# Patient Record
Sex: FEMALE | Race: WHITE | NOT HISPANIC OR LATINO | Employment: FULL TIME | URBAN - METROPOLITAN AREA
[De-identification: names, ages, dates, MRNs, and addresses within clinical notes are randomized per-mention and may not be internally consistent; named-entity substitution may affect disease eponyms.]

---

## 2017-01-17 ENCOUNTER — HOSPITAL ENCOUNTER (OUTPATIENT)
Dept: LAB | Facility: MEDICAL CENTER | Age: 32
End: 2017-01-17
Attending: OBSTETRICS & GYNECOLOGY
Payer: COMMERCIAL

## 2017-01-17 LAB
GLUCOSE 1H P 50 G GLC PO SERPL-MCNC: 120 MG/DL (ref 70–139)
HCT VFR BLD AUTO: 38.1 % (ref 37–47)
HGB BLD-MCNC: 13.1 G/DL (ref 12–16)
PLATELET # BLD AUTO: 209 K/UL (ref 164–446)

## 2017-01-17 PROCEDURE — 85014 HEMATOCRIT: CPT

## 2017-01-17 PROCEDURE — 36415 COLL VENOUS BLD VENIPUNCTURE: CPT

## 2017-01-17 PROCEDURE — 82950 GLUCOSE TEST: CPT

## 2017-01-17 PROCEDURE — 85049 AUTOMATED PLATELET COUNT: CPT

## 2017-01-17 PROCEDURE — 85018 HEMOGLOBIN: CPT

## 2017-01-19 ENCOUNTER — HOSPITAL ENCOUNTER (OUTPATIENT)
Dept: LAB | Facility: MEDICAL CENTER | Age: 32
End: 2017-01-19
Attending: OBSTETRICS & GYNECOLOGY
Payer: COMMERCIAL

## 2017-01-19 PROCEDURE — 87591 N.GONORRHOEAE DNA AMP PROB: CPT

## 2017-01-19 PROCEDURE — 87491 CHLMYD TRACH DNA AMP PROBE: CPT

## 2017-01-22 LAB
C TRACH DNA SPEC QL NAA+PROBE: NEGATIVE
N GONORRHOEA DNA SPEC QL NAA+PROBE: NEGATIVE
SOURCE 9121: NORMAL
SPECIMEN SOURCE: NORMAL

## 2017-01-23 ENCOUNTER — APPOINTMENT (OUTPATIENT)
Dept: OTHER | Facility: IMAGING CENTER | Age: 32
End: 2017-01-23

## 2017-03-14 ENCOUNTER — HOSPITAL ENCOUNTER (OUTPATIENT)
Dept: LAB | Facility: MEDICAL CENTER | Age: 32
End: 2017-03-14
Attending: PHYSICIAN ASSISTANT
Payer: COMMERCIAL

## 2017-03-14 PROCEDURE — 87653 STREP B DNA AMP PROBE: CPT

## 2017-03-16 LAB — GP B STREP DNA SPEC QL NAA+PROBE: POSITIVE

## 2017-05-03 ENCOUNTER — HOSPITAL ENCOUNTER (INPATIENT)
Facility: MEDICAL CENTER | Age: 32
LOS: 1 days | End: 2017-05-04
Attending: OBSTETRICS & GYNECOLOGY | Admitting: OBSTETRICS & GYNECOLOGY
Payer: COMMERCIAL

## 2017-05-03 LAB
BASOPHILS # BLD AUTO: 0.6 % (ref 0–1.8)
BASOPHILS # BLD: 0.08 K/UL (ref 0–0.12)
EOSINOPHIL # BLD AUTO: 0.01 K/UL (ref 0–0.51)
EOSINOPHIL NFR BLD: 0.1 % (ref 0–6.9)
ERYTHROCYTE [DISTWIDTH] IN BLOOD BY AUTOMATED COUNT: 41.3 FL (ref 35.9–50)
ERYTHROCYTE [DISTWIDTH] IN BLOOD BY AUTOMATED COUNT: 43 FL (ref 35.9–50)
HCT VFR BLD AUTO: 31.1 % (ref 37–47)
HCT VFR BLD AUTO: 39.7 % (ref 37–47)
HGB BLD-MCNC: 10.4 G/DL (ref 12–16)
HGB BLD-MCNC: 13.3 G/DL (ref 12–16)
HOLDING TUBE BB 8507: NORMAL
IMM GRANULOCYTES # BLD AUTO: 0.09 K/UL (ref 0–0.11)
IMM GRANULOCYTES NFR BLD AUTO: 0.6 % (ref 0–0.9)
LYMPHOCYTES # BLD AUTO: 1.62 K/UL (ref 1–4.8)
LYMPHOCYTES NFR BLD: 11.3 % (ref 22–41)
MCH RBC QN AUTO: 28.6 PG (ref 27–33)
MCH RBC QN AUTO: 28.7 PG (ref 27–33)
MCHC RBC AUTO-ENTMCNC: 32.7 G/DL (ref 33.6–35)
MCHC RBC AUTO-ENTMCNC: 33.5 G/DL (ref 33.6–35)
MCV RBC AUTO: 85.7 FL (ref 81.4–97.8)
MCV RBC AUTO: 87.5 FL (ref 81.4–97.8)
MONOCYTES # BLD AUTO: 0.44 K/UL (ref 0–0.85)
MONOCYTES NFR BLD AUTO: 3.1 % (ref 0–13.4)
NEUTROPHILS # BLD AUTO: 12.11 K/UL (ref 2–7.15)
NEUTROPHILS NFR BLD: 84.3 % (ref 44–72)
NRBC # BLD AUTO: 0 K/UL
NRBC BLD AUTO-RTO: 0 /100 WBC
PLATELET # BLD AUTO: 146 K/UL (ref 164–446)
PLATELET # BLD AUTO: 165 K/UL (ref 164–446)
PMV BLD AUTO: 11.7 FL (ref 9–12.9)
PMV BLD AUTO: 12.2 FL (ref 9–12.9)
RBC # BLD AUTO: 3.6 M/UL (ref 4.2–5.4)
RBC # BLD AUTO: 4.63 M/UL (ref 4.2–5.4)
WBC # BLD AUTO: 14.4 K/UL (ref 4.8–10.8)
WBC # BLD AUTO: 17.3 K/UL (ref 4.8–10.8)

## 2017-05-03 PROCEDURE — 304965 HCHG RECOVERY SERVICES

## 2017-05-03 PROCEDURE — A9270 NON-COVERED ITEM OR SERVICE: HCPCS | Performed by: OBSTETRICS & GYNECOLOGY

## 2017-05-03 PROCEDURE — 700102 HCHG RX REV CODE 250 W/ 637 OVERRIDE(OP): Performed by: OBSTETRICS & GYNECOLOGY

## 2017-05-03 PROCEDURE — 4A1HXCZ MONITORING OF PRODUCTS OF CONCEPTION, CARDIAC RATE, EXTERNAL APPROACH: ICD-10-PCS | Performed by: OBSTETRICS & GYNECOLOGY

## 2017-05-03 PROCEDURE — 770002 HCHG ROOM/CARE - OB PRIVATE (112)

## 2017-05-03 PROCEDURE — 36415 COLL VENOUS BLD VENIPUNCTURE: CPT

## 2017-05-03 PROCEDURE — 700111 HCHG RX REV CODE 636 W/ 250 OVERRIDE (IP)

## 2017-05-03 PROCEDURE — 700105 HCHG RX REV CODE 258: Performed by: OBSTETRICS & GYNECOLOGY

## 2017-05-03 PROCEDURE — 85027 COMPLETE CBC AUTOMATED: CPT

## 2017-05-03 PROCEDURE — 700111 HCHG RX REV CODE 636 W/ 250 OVERRIDE (IP): Performed by: OBSTETRICS & GYNECOLOGY

## 2017-05-03 PROCEDURE — 85025 COMPLETE CBC W/AUTO DIFF WBC: CPT

## 2017-05-03 PROCEDURE — 10907ZC DRAINAGE OF AMNIOTIC FLUID, THERAPEUTIC FROM PRODUCTS OF CONCEPTION, VIA NATURAL OR ARTIFICIAL OPENING: ICD-10-PCS | Performed by: OBSTETRICS & GYNECOLOGY

## 2017-05-03 PROCEDURE — 0DQR0ZZ REPAIR ANAL SPHINCTER, OPEN APPROACH: ICD-10-PCS | Performed by: OBSTETRICS & GYNECOLOGY

## 2017-05-03 PROCEDURE — 700112 HCHG RX REV CODE 229: Performed by: OBSTETRICS & GYNECOLOGY

## 2017-05-03 PROCEDURE — 59409 OBSTETRICAL CARE: CPT

## 2017-05-03 RX ORDER — VITAMIN A ACETATE, BETA CAROTENE, ASCORBIC ACID, CHOLECALCIFEROL, .ALPHA.-TOCOPHEROL ACETATE, DL-, THIAMINE MONONITRATE, RIBOFLAVIN, NIACINAMIDE, PYRIDOXINE HYDROCHLORIDE, FOLIC ACID, CYANOCOBALAMIN, CALCIUM CARBONATE, FERROUS FUMARATE, ZINC OXIDE, CUPRIC OXIDE 3080; 12; 120; 400; 1; 1.84; 3; 20; 22; 920; 25; 200; 27; 10; 2 [IU]/1; UG/1; MG/1; [IU]/1; MG/1; MG/1; MG/1; MG/1; MG/1; [IU]/1; MG/1; MG/1; MG/1; MG/1; MG/1
1 TABLET, FILM COATED ORAL EVERY MORNING
Status: DISCONTINUED | OUTPATIENT
Start: 2017-05-04 | End: 2017-05-04 | Stop reason: HOSPADM

## 2017-05-03 RX ORDER — ONDANSETRON 2 MG/ML
4 INJECTION INTRAMUSCULAR; INTRAVENOUS EVERY 6 HOURS PRN
Status: DISCONTINUED | OUTPATIENT
Start: 2017-05-03 | End: 2017-05-04 | Stop reason: HOSPADM

## 2017-05-03 RX ORDER — MISOPROSTOL 200 UG/1
800 TABLET ORAL
Status: DISCONTINUED | OUTPATIENT
Start: 2017-05-03 | End: 2017-05-04 | Stop reason: HOSPADM

## 2017-05-03 RX ORDER — PENICILLIN G POTASSIUM 5000000 [IU]/1
5 INJECTION, POWDER, FOR SOLUTION INTRAMUSCULAR; INTRAVENOUS ONCE
Status: COMPLETED | OUTPATIENT
Start: 2017-05-03 | End: 2017-05-03

## 2017-05-03 RX ORDER — SODIUM CHLORIDE, SODIUM LACTATE, POTASSIUM CHLORIDE, CALCIUM CHLORIDE 600; 310; 30; 20 MG/100ML; MG/100ML; MG/100ML; MG/100ML
INJECTION, SOLUTION INTRAVENOUS PRN
Status: DISCONTINUED | OUTPATIENT
Start: 2017-05-03 | End: 2017-05-04 | Stop reason: HOSPADM

## 2017-05-03 RX ORDER — MISOPROSTOL 200 UG/1
800 TABLET ORAL
Status: COMPLETED | OUTPATIENT
Start: 2017-05-03 | End: 2017-05-03

## 2017-05-03 RX ORDER — ONDANSETRON 4 MG/1
4 TABLET, ORALLY DISINTEGRATING ORAL EVERY 6 HOURS PRN
Status: DISCONTINUED | OUTPATIENT
Start: 2017-05-03 | End: 2017-05-04 | Stop reason: HOSPADM

## 2017-05-03 RX ORDER — DOCUSATE SODIUM 100 MG/1
100 CAPSULE, LIQUID FILLED ORAL 2 TIMES DAILY
Status: DISCONTINUED | OUTPATIENT
Start: 2017-05-03 | End: 2017-05-04 | Stop reason: HOSPADM

## 2017-05-03 RX ORDER — OXYCODONE HYDROCHLORIDE AND ACETAMINOPHEN 5; 325 MG/1; MG/1
2 TABLET ORAL EVERY 4 HOURS PRN
Status: DISCONTINUED | OUTPATIENT
Start: 2017-05-03 | End: 2017-05-04 | Stop reason: HOSPADM

## 2017-05-03 RX ORDER — PENICILLIN G POTASSIUM 5000000 [IU]/1
INJECTION, POWDER, FOR SOLUTION INTRAMUSCULAR; INTRAVENOUS
Status: COMPLETED
Start: 2017-05-03 | End: 2017-05-03

## 2017-05-03 RX ORDER — METHYLERGONOVINE MALEATE 0.2 MG/ML
0.2 INJECTION INTRAVENOUS
Status: DISCONTINUED | OUTPATIENT
Start: 2017-05-03 | End: 2017-05-04 | Stop reason: HOSPADM

## 2017-05-03 RX ORDER — IBUPROFEN 600 MG/1
600 TABLET ORAL EVERY 6 HOURS PRN
Status: DISCONTINUED | OUTPATIENT
Start: 2017-05-03 | End: 2017-05-04 | Stop reason: HOSPADM

## 2017-05-03 RX ORDER — CEFAZOLIN SODIUM 1 G/3ML
INJECTION, POWDER, FOR SOLUTION INTRAMUSCULAR; INTRAVENOUS
Status: ACTIVE
Start: 2017-05-03 | End: 2017-05-03

## 2017-05-03 RX ORDER — OXYCODONE HYDROCHLORIDE AND ACETAMINOPHEN 5; 325 MG/1; MG/1
1 TABLET ORAL EVERY 4 HOURS PRN
Status: DISCONTINUED | OUTPATIENT
Start: 2017-05-03 | End: 2017-05-04 | Stop reason: HOSPADM

## 2017-05-03 RX ORDER — CEFAZOLIN SODIUM 2 G/100ML
2 INJECTION, SOLUTION INTRAVENOUS ONCE
Status: COMPLETED | OUTPATIENT
Start: 2017-05-03 | End: 2017-05-03

## 2017-05-03 RX ORDER — SODIUM CHLORIDE, SODIUM LACTATE, POTASSIUM CHLORIDE, CALCIUM CHLORIDE 600; 310; 30; 20 MG/100ML; MG/100ML; MG/100ML; MG/100ML
INJECTION, SOLUTION INTRAVENOUS
Status: ACTIVE
Start: 2017-05-03 | End: 2017-05-03

## 2017-05-03 RX ADMIN — DOCUSATE SODIUM 100 MG: 100 CAPSULE ORAL at 16:23

## 2017-05-03 RX ADMIN — IBUPROFEN 600 MG: 600 TABLET, FILM COATED ORAL at 11:19

## 2017-05-03 RX ADMIN — DOCUSATE SODIUM 100 MG: 100 CAPSULE ORAL at 21:10

## 2017-05-03 RX ADMIN — Medication 20 UNITS: at 09:58

## 2017-05-03 RX ADMIN — PENICILLIN G POTASSIUM 5 MILLION UNITS: 5000000 POWDER, FOR SOLUTION INTRAMUSCULAR; INTRAPLEURAL; INTRATHECAL; INTRAVENOUS at 05:12

## 2017-05-03 RX ADMIN — MISOPROSTOL 800 MCG: 200 TABLET ORAL at 11:50

## 2017-05-03 RX ADMIN — CEFAZOLIN SODIUM 2 G: 2 INJECTION, SOLUTION INTRAVENOUS at 10:23

## 2017-05-03 RX ADMIN — Medication 20 UNITS: at 11:20

## 2017-05-03 RX ADMIN — OXYCODONE HYDROCHLORIDE AND ACETAMINOPHEN 1 TABLET: 5; 325 TABLET ORAL at 21:13

## 2017-05-03 RX ADMIN — SODIUM CHLORIDE 2.5 MILLION UNITS: 9 INJECTION, SOLUTION INTRAVENOUS at 09:07

## 2017-05-03 RX ADMIN — IBUPROFEN 600 MG: 600 TABLET, FILM COATED ORAL at 18:10

## 2017-05-03 ASSESSMENT — COPD QUESTIONNAIRES
HAVE YOU SMOKED AT LEAST 100 CIGARETTES IN YOUR ENTIRE LIFE: NO/DON'T KNOW
DURING THE PAST 4 WEEKS HOW MUCH DID YOU FEEL SHORT OF BREATH: NONE/LITTLE OF THE TIME
DO YOU EVER COUGH UP ANY MUCUS OR PHLEGM?: NO/ONLY WITH OCCASIONAL COLDS OR INFECTIONS

## 2017-05-03 ASSESSMENT — PAIN SCALES - GENERAL
PAINLEVEL_OUTOF10: 3
PAINLEVEL_OUTOF10: 3

## 2017-05-03 ASSESSMENT — LIFESTYLE VARIABLES
EVER_SMOKED: NEVER
DO YOU DRINK ALCOHOL: NO
ALCOHOL_USE: NO

## 2017-05-03 NOTE — PROGRESS NOTES
0440 32y/o  edc 17, EGA 40 , Here to l&d room s223 with Family. C/O UC's. EFM/TOCO applied, patients states positive fetal movement. Denies vaginal bleeding or leaking of fluid. SVE /1, Dr Reeves updated and orders received  0645 SVE ant lip  0700 report to GANESH Wells

## 2017-05-03 NOTE — IP AVS SNAPSHOT
MWHS Access Code: [email or web address]  Expires: 6/3/2017 10:55 AM    MWHS  A secure, online tool to manage your health information     [employer]’s MWHS® is a secure, online tool that connects you to your personalized health information from the privacy of your home -- day or night - making it very easy for you to manage your healthcare. Once the activation process is completed, you can even access your medical information using the MWHS jaymie, which is available for free in the Apple Jaymie store or Google Play store.     MWHS provides the following levels of access (as shown below):   My Chart Features   [facility] Primary Care Doctor [facility]  Specialists [facility]  Urgent  Care Non-[facility]  Primary Care  Doctor   Email your healthcare team securely and privately 24/7 X X X X   Manage appointments: schedule your next appointment; view details of past/upcoming appointments X      Request prescription refills. X      View recent personal medical records, including lab and immunizations X X X X   View health record, including health history, allergies, medications X X X X   Read reports about your outpatient visits, procedures, consult and ER notes X X X X   See your discharge summary, which is a recap of your hospital and/or ER visit that includes your diagnosis, lab results, and care plan. X X       How to register for MWHS:  1. Go to  [email or web address].  2. Click on the Sign Up Now box, which takes you to the New Member Sign Up page. You will need to provide the following information:  a. Enter your MWHS Access Code exactly as it appears at the top of this page. (You will not need to use this code after you’ve completed the sign-up process. If you do not sign up before the expiration date, you must request a new code.)   b. Enter your date of birth.   c. Enter your home email address.   d. Click Submit, and follow the next screen’s instructions.  3. Create a MWHS ID. This will be your MWHS  login ID and cannot be changed, so think of one that is secure and easy to remember.  4. Create a Veracode password. You can change your password at any time.  5. Enter your Password Reset Question and Answer. This can be used at a later time if you forget your password.   6. Enter your e-mail address. This allows you to receive e-mail notifications when new information is available in Veracode.  7. Click Sign Up. You can now view your health information.    For assistance activating your Verac[employer] account, call [phone]

## 2017-05-03 NOTE — PROGRESS NOTES
"Labor Progress Note    Nilam Freed   IUP at 40.6/active labor      Subjective:  Pt with painful contractions.   Uterine contractions:yes  Pain: Yes. Severity: moderate    Objective:   Filed Vitals:    17 0447 17 0524   BP:  118/87   Pulse:  98   Height: 1.588 m (5' 2.5\")    Weight: 79.833 kg (176 lb)      Fetal heart variability: 140's category 1 no decels  [place]: q 3-5  mins   Cervical: 75% ;  Dilatation .7 ; Station negative1    Membranes ruptured: .no    Meds:   Epidural : .no  Pitocin: .no    Labs:  Recent Results (from the past 24 hour(s))   CBC WITH DIFFERENTIAL    Collection Time: 17  5:00 AM   Result Value Ref Range    WBC 14.4 (H) 4.8 - 10.8 K/uL    RBC 4.63 4.20 - 5.40 M/uL    Hemoglobin 13.3 12.0 - 16.0 g/dL    Hematocrit 39.7 37.0 - 47.0 %    MCV 85.7 81.4 - 97.8 fL    MCH 28.7 27.0 - 33.0 pg    MCHC 33.5 (L) 33.6 - 35.0 g/dL    RDW 41.3 35.9 - 50.0 fL    Platelet Count 165 164 - 446 K/uL    MPV 12.2 9.0 - 12.9 fL    Neutrophils-Polys 84.30 (H) 44.00 - 72.00 %    Lymphocytes 11.30 (L) 22.00 - 41.00 %    Monocytes 3.10 0.00 - 13.40 %    Eosinophils 0.10 0.00 - 6.90 %    Basophils 0.60 0.00 - 1.80 %    Immature Granulocytes 0.60 0.00 - 0.90 %    Nucleated RBC 0.00 /100 WBC    Neutrophils (Absolute) 12.11 (H) 2.00 - 7.15 K/uL    Lymphs (Absolute) 1.62 1.00 - 4.80 K/uL    Monos (Absolute) 0.44 0.00 - 0.85 K/uL    Eos (Absolute) 0.01 0.00 - 0.51 K/uL    Baso (Absolute) 0.08 0.00 - 0.12 K/uL    Immature Granulocytes (abs) 0.09 0.00 - 0.11 K/uL    NRBC (Absolute) 0.00 K/uL   HOLD BLOOD BANK SPECIMEN (NOT TESTED)    Collection Time: 17  5:00 AM   Result Value Ref Range    Holding Tube - Bb DONE        Assessment:   IUP at 40.6/active labor- pt admitted, expt   GBBS positive- pt on Penicillin G per protocol, s/p first dose, 2nd dose in 4 hrs.   Fetal status- reassuring    Maricel Reeves            "

## 2017-05-03 NOTE — H&P
CHIEF COMPLAINT:  Contractions.    HISTORY OF PRESENT ILLNESS:  This patient is a 31-year-old  1, para 0   at 40 weeks and 6 days by dates and ultrasound who has had prenatal care with   Dr. Woods.  It has been uncomplicated.  Patient has been laboring at home   with her  and comes in complaining of regular painful contractions.  When   she arrived, she was found to be 7 cm dilated, 75% effaced, and -1 station.    Patient at this time is laboring and declines anything for pain.  She is GBS   positive and has had one dose of penicillin G.  She did transfer care to Dr. Woods at 26 weeks.  This pregnancy was via IVF secondary to unexplained   infertility.    PAST MEDICAL HISTORY:  Unexplained infertility.    PAST SURGICAL HISTORY:  Tonsillectomy.    MEDICINES:  Prenatal vitamins.    ALLERGIES:  DOXYCYCLINE CAUSES ABDOMINAL PAIN.    OBSTETRICAL HISTORY:  She is a  1.    GYNECOLOGIC HISTORY:  Patient started menstruating at age 11, has a menstrual   cycle every 28 days, lasted about 5 days.  Last menstrual period is on   2016.  No history of STDs.    SOCIAL HISTORY:  Patient is .  She denies tobacco, alcohol or drug use.    PHYSICAL EXAMINATION:  VITAL SIGNS:  Stable.  She is afebrile.  GENERAL:  Pleasant female in some distress secondary to contractions.  LUNGS:  Clear to auscultation bilaterally.  CARDIOVASCULAR:  Regular rhythm.  No murmur.  ABDOMEN:  Soft, gravid, Leopold's to about 7-1/2 pounds.  PELVIC:  Fetal heart tones 140s, category 1 strip.  No decelerations.  [place]   gracie every 3-5 minutes.  Sterile vaginal exam, 6 cm dilated, 75%   effaced, -1 station.  EXTREMITIES:  No calf tenderness.    PRENATAL LABS:  She is GBS positive.  Chlamydia and gonorrhea are negative.    1-hour glucose 120.   H and H 14.2 and 41.5, platelets 237.  Hepatitis B   surface antigen negative.  HIV negative.  Her blood type is O positive, RPR   nonreactive.    ASSESSMENT AND PLAN:  A  31-year-old  1, para 0 at 40 weeks and 6 days   by dates and ultrasound.  1.  Active labor.  Patient has been admitted.  At this time, she declines   anything for pain.  Her  is at her bedside for ____ and patient is doing   well.  We will expect a normal spontaneous vaginal delivery.  2.  GBS positive.  Patient is on penicillin G per protocol, status post first   dose and will give the second dose in 4 hours.  We will discuss rupture of   membranes after her second dose of penicillin.  3.  Fetal status reassuring.  4.  In vitro fertilization pregnancy secondary to unexplained infertility.       ____________________________________     MD CARMINE GARCIA / OBED    DD:  2017 06:05:48  DT:  2017 07:00:19    D#:  [id number]  Job#:  [id number]    cc: SANJUANITA MENG MD

## 2017-05-03 NOTE — DELIVERY NOTE
Baby:  Male, APGARs 8-9, not weighed yet  Meconium noted at AROM (approx 07:15 when 9 cm dilated), NNRT attended but no RT needed.  plac spont,  cc  No epis  Partial 3rd-degree lac, 2-0 and 3-0 vicryl, 1% lidocaine, Ancef 2 g proph  GBS pos, got 2 doses ampicillin, afebrile

## 2017-05-03 NOTE — DELIVERY NOTE
DATE OF SERVICE:  2017    DATE OF DELIVERY:  2017.    PROCEDURES:  1.  Spontaneous vaginal delivery.  2.  Repair of partial third-degree laceration.    OBSTETRICIAN:  Daniel Woods MD.    DIAGNOSES:  1.  40 and 6/7th weeks gestation, delivered.  2.  Group B streptococcus positive.  3.  Meconium.    COMPLICATIONS:  None.    ESTIMATED BLOOD LOSS:  600 mL.    BABY:  Male, 1-minute Apgar 8 and 5-minute Apgar 9.  Weight: 3775 grams.    ANTIBIOTIC PROPHYLAXIS:  1.  Ampicillin during labor for group B strep prophylaxis, total of 2 doses.  2.  Ancef 2 g IV when third-degree laceration diagnosed.    HISTORY:  The patient is a 31-year-old lady,  1, now para 1, who   presented 6 days after her due date with spontaneous labor and intact   membranes.  She was known to be group B strep positive during her prenatal   care.  She chose to remain at home for approximately 20 hours rather than   coming to the hospital to start group B strep prophylaxis as I had advised.    At any rate, she arrived with intact membranes.  She was afebrile.  She   promptly started IV ampicillin group B strep prophylaxis.  She received 2   doses prior to delivery and was afebrile throughout.  Amniotomy at 9 cm   dilatation produced thick meconium.  Delivery occurred approximately 2.5 hours   post-amniotomy.  She pushed effectively.  She pushed the baby out occiput   anterior with no episiotomy, after I had injected 1% lidocaine locally into   her perineum.  I deeply bulb suctioned the baby's mouth as well as both nares.    There were no nuchal cords.  The shoulders and body delivered easily.  I   placed the baby on her chest.  Two minutes later, I doubly clamped and cut the   cord.  The  respiratory therapist did attend the delivery because of   the meconium, but his services were not needed as the baby was quite vigorous.    The placenta and all attached membranes delivered spontaneously.  There was a   3-vessel cord  with marginal insertion.    The patient sustained a partial third-degree laceration, fracturing just a   very top of the external anal sphincter muscle and fascia.  I approximated the   anal sphincter muscle fascia and the perineal body with running 2-0 Vicryl.    The remainder of the wound was closed in the standard layered fashion with   running 3-0 Vicryl.  Sponge and needle counts were correct.    ESTIMATED BLOOD LOSS:  600 mL.    MEDICATIONS:  Ancef 2 g was given when the partial third-degree laceration was   first diagnosed.       ____________________________________     MD ALTA BOLES / OBED    DD:  05/03/2017 10:41:27  DT:  05/03/2017 11:02:48    D#:  [id number]  Job#:  [id number]

## 2017-05-03 NOTE — CARE PLAN
Problem: Safety  Goal: Free from accidental injury  Patient will remain free from accidental injury while in labor    Problem: Pain  Goal: Alleviation of Pain or a reduction in pain to the patient’s comfort goal  Patient desiring a natural labor with no pain medication intervention, support provided

## 2017-05-03 NOTE — CARE PLAN
Problem: Knowledge Deficit  Goal: Knowledge of disease process/condition, treatment plan, diagnostic tests, and medications will improve  Intervention: Explain information regarding disease process/condition, treatment plan, diagnostic tests, and medications and document in education  Discussed and coached positions for delivery and pushing      Problem: Pain  Goal: Alleviation of Pain or a reduction in pain to the patient’s comfort goal  Intervention: Initial pain assessment  Coached for breathing and positions

## 2017-05-03 NOTE — IP AVS SNAPSHOT
5/4/2017    Nilam Freed  [address] [place] [address]    Dear Nilam:    [facility] wants to ensure your discharge home is safe and you or your loved ones have had all of your questions answered regarding your care after you leave the hospital.    Below is a list of resources and contact information should you have any questions regarding your hospital stay, follow-up instructions, or active medical symptoms.    Questions or Concerns Regarding… Contact   Medical Questions Related to Your Discharge  (7 days a week, 8am-5pm) Contact a Nurse Care Coordinator   [phone]   Medical Questions Not Related to Your Discharge  (24 hours a day / 7 days a week)  Contact the Nurse Health Line   [phone]    Medications or Discharge Instructions Refer to your discharge packet   or contact your [facility] Primary Care Provider   [phone]   Follow-up Appointment(s) Schedule your appointment via [employer]ur[employer]   or contact Scheduling [phone]   Billing Review your statement via [employer]  or contact Billing [phone]   Medical Records Review your records via Sec[employer]   or contact Medical Records [phone]     You may receive a telephone call within two days of discharge. This call is to make certain you understand your discharge instructions and have the opportunity to have any questions answered. You can also easily access your medical information, test results and upcoming appointments via the [employer] free online health management tool. You can learn more and sign up at [employer]. For assistance setting up your [employer] account, please call [phone].    Once again, we want to ensure your discharge home is safe and that you have a clear understanding of any next steps in your care. If you have any questions or concerns, please do not hesitate to contact us, we are here for you. Thank you for choosing [facility] for your healthcare needs.    Sincerely,    Your [facility] Healthcare Team

## 2017-05-03 NOTE — PROGRESS NOTES
Patient arrived on unit at 1340 with infant and L&D RN in wheelchair. Assessment completed. Fundus firm; lochia light rubra. Patient states pain 3/10; declines medication. Patient oriented to room; verbalizes understanding. Plan of care discussed. All questions and concerns addressed. Call light demonstrated. Bed in lowest, locked position. Will continue to monitor.

## 2017-05-03 NOTE — PROGRESS NOTES
S: pt breathing thru contractions  O: VSS , afebrile  FHT; 140's, category 1 occ early decels  [place]: q 1-2  SVE: AL/c/0 BBOW, per nurse  Meds: s/p Pen G loading dose     A/P IUP at 40.6/active labor  Pt progressing, expt .  Dr. Woods will take over.

## 2017-05-03 NOTE — PROGRESS NOTES
IUP at 40.6  S: pt with pressure  O: VSS, afebrile  FHT: 140's decrease LTV, no decels  [place]: q 3-5  SVE: 9/c/0 per nurse    Meds: Pen G, s/p 1 st dose    A/P IUP at 40.6/active labor  Progressing, expt   GBBS positive- s/p 1st dose of Pen G, afebrile.

## 2017-05-03 NOTE — IP AVS SNAPSHOT
Home Care Instructions                                                                                                                Nilam Freed   MRN: [id number]    Department:  POST PARTUM [facility]   5/3/2017           Follow-up Information     1. Follow up with Daniel Woods M.D.. Schedule an appointment as soon as possible for a visit in 6 weeks.    Specialty:  OB/Gyn    Contact information    [address]  [address]  [place] [address]  [phone]         I assume responsibility for securing a follow-up  Screening blood test on my baby within the specified date range.    -                  Discharge Instructions       POSTPARTUM DISCHARGE INSTRUCTIONS FOR MOM    YOB: 1985   Age: 31 y.o.               Admit Date: 5/3/2017     Discharge Date: 2017  Attending Doctor:  Daniel Woods M.D.                  Allergies:  Review of patient's allergies indicates not on file.    Discharged to home by car. Discharged via wheelchair, hospital escort: Yes.  Special equipment needed: Not Applicable  Belongings with: Personal  Be sure to schedule a follow-up appointment with your primary care doctor or any specialists as instructed.     Discharge Plan:   Diet Plan: Discussed  Activity Level: Discussed  Confirmed Follow up Appointment: Patient to Call and Schedule Appointment  Confirmed Symptoms Management: Discussed  Medication Reconciliation Updated: Yes  Influenza Vaccine Indication: Not indicated: Previously immunized this influenza season and > 8 years of age    REASONS TO CALL YOUR OBSTETRICIAN:  1.   Persistent fever or shaking chills (Temperature higher than 100.4)  2.   Heavy bleeding (soaking more than 1 pad per hour); Passing clots  3.   Foul odor from vagina  4.   Mastitis (Breast infection; breast pain, chills, fever, redness)  5.   Urinary pain, burning or frequency  6.   Episiotomy infection    8.   Severe depression longer than 24 hours    HAND WASHING  · Prior to handling the  "baby.  · Before breastfeeding or bottle feeding baby.  · After using the bathroom or changing the baby's diaper.      VAGINAL CARE  · Nothing inside vagina for 6 weeks: no sexual intercourse, tampons or douching.  · Bleeding may continue for 2-4 weeks.  Amount may vary.    · Call your physician for heavy bleeding which means soaking more than 1 pad per hour    BIRTH CONTROL  · It is possible to become pregnant at any time after delivery and while breastfeeding.  · Plan to discuss a method of birth control with your physician at your follow up visit. visit.    DIET AND ELIMINATION  · Eating more fiber (bran cereal, fruits, and vegetables) and drinking plenty of fluids will help to avoid constipation.  · Urinary frequency after childbirth is normal.    POSTPARTUM BLUES  During the first few days after birth, you may experience a sense of the \"blues\" which may include impatience, irritability or even crying.  These feeling come and go quickly.  However, as many as 1 in 10 women experience emotional symptoms known as postpartum depression.    Postpartum depression:  May start as early as the second or third day after delivery or take several weeks or months to develop.  Symptoms of \"blues\" are present, but are more intense:  Crying spells; loss of appetite; feelings of hopelessness or loss of control; fear of touching the baby; over concern or no concern at all about the baby; little or no concern about your own appearance/caring for yourself; and/or inability to sleep or excessive sleeping.  Contact your physician if you are experiencing any of these symptoms.    Crisis Hotline:  · [place] Crisis Hotline:  1-586-JUPQXXR  Or 1-119.638.3589  · [place] Crisis Hotline:  1-144.493.5055  Or 996-922-8350    PREVENTING SHAKEN BABY:  If you are angry or stressed, PUT THE BABY IN THE CRIB, step away, take some deep breaths, and wait until you are calm to care for the baby.  DO NOT SHAKE THE BABY.  You are not alone, call a " "supporter for help.    · Crisis Call Center 24/7 crisis line 773-552-3329 or 1-760.614.6756  · You can also text them, text \"ANSWER\" to 517330    QUIT SMOKING/TOBACCO USE:  I understand the use of any tobacco products increases my chance of suffering from future heart disease and could cause other illnesses which may shorten my life. Quitting the use of tobacco products is the single most important thing I can do to improve my health. For further information on smoking / tobacco cessation call a Toll Free Quit Line at 1-636.852.9690 (*National [employer] [place]) or 1-411.736.9659 (American Lung Association) or you can access the web based program at www.lungusa.org.    · [place] Tobacco Users Help Line:  (729) 637-7601       Toll Free: 1-133.918.7416  · Quit Tobacco Program [facility] (003)679-6325    DEPRESSION / SUICIDE RISK:  As you are discharged from this [facility], it is important to learn how to keep safe from harming yourself.    Recognize the warning signs:  · Abrupt changes in personality, positive or negative- including increase in energy   · Giving away possessions  · Change in eating patterns- significant weight changes-  positive or negative  · Change in sleeping patterns- unable to sleep or sleeping all the time   · Unwillingness or inability to communicate  · Depression  · Unusual sadness, discouragement and loneliness  · Talk of wanting to die  · Neglect of personal appearance   · Rebelliousness- reckless behavior  · Withdrawal from people/activities they love  · Confusion- inability to concentrate     If you or a loved one observes any of these behaviors or has concerns about self-harm, here's what you can do:  · Talk about it- your feelings and reasons for harming yourself  · Remove any means that you might use to hurt yourself (examples: pills, rope, extension cords, firearm)  · Get professional help from the community (Mental Health, Substance Abuse, " psychological counseling)  · Do not be alone:Call your Safe Contact- someone whom you trust who will be there for you.  · Call your local CRISIS HOTLINE 105-0390 or 920-852-2215  · Call your local Children's Mobile Crisis Response Team [place] (383) 795-3664 or www.DropShip  · Call the toll free National Suicide Prevention Hotlines   · National Suicide Prevention Lifeline 678-273-WXNP (7547)  · National Hope Line Network 800-SUICIDE (235-5933)    DISCHARGE SURVEY:  Thank you for choosing [facility].  We hope we provided you with very good care.  You may be receiving a survey in the mail.  Please fill it out.  Your opinion is valuable to us.    ADDITIONAL EDUCATIONAL MATERIALS GIVEN TO PATIENT:        My signature on this form indicates that:  1.  I have reviewed and understand the above information  2.  My questions regarding this information have been answered to my satisfaction.  3.  I have formulated a plan with my discharge nurse to obtain my prescribed medication for home.         Discharge Medication Instructions:    Below are the medications your physician expects you to take upon discharge:    Review all your home medications and newly ordered medications with your doctor and/or pharmacist. Follow medication instructions as directed by your doctor and/or pharmacist.    Please keep your medication list with you and share with your physician.               Medication List      START taking these medications        Instructions    Morning Afternoon Evening Bedtime     MG Caps   Last time this was given:  100 mg on 5/4/2017  8:32 AM        Take 100 mg by mouth 2 Times a Day.   Dose:  100 mg                        ibuprofen 600 MG Tabs   Last time this was given:  600 mg on 5/4/2017  6:34 AM   Commonly known as:  MOTRIN        Take 1 Tab by mouth every 6 hours as needed for Mild Pain or Moderate Pain.   Dose:  600 mg                        oxycodone-acetaminophen 5-325 MG Tabs   Last time  this was given:  1 Tab on 5/3/2017  9:13 PM   Commonly known as:  PERCOCET        Take 1-2 Tabs by mouth every four hours as needed for Severe Pain (for Moderate Pain (Pain Scale 4-6) after delivery).   Dose:  1-2 Tab                          CONTINUE taking these medications        Instructions    Morning Afternoon Evening Bedtime    PRENATAL VITAMIN PO        Take  by mouth.                             Where to Get Your Medications      Information about where to get these medications is not yet available     ! Ask your nurse or doctor about these medications    -  MG Caps  - ibuprofen 600 MG Tabs  - oxycodone-acetaminophen 5-325 MG Tabs            Crisis Hotline:     [place] Crisis Hotline:  8-384-UTKZLOE or 1-921.754.4739    [place] Crisis Hotline:    1-570.439.1292 or 553-132-8276        Disclaimer           _____________________________________                     __________       ________       Patient/Mother Signature or Legal                          Date                   Time

## 2017-05-03 NOTE — CARE PLAN
Problem: Altered physiologic condition related to immediate post-delivery state and potential for bleeding/hemorrhage  Goal: Patient physiologically stable as evidenced by normal lochia, palpable uterine involution and vital signs within normal limits  Outcome: PROGRESSING AS EXPECTED  Fundal massage per protocol.     Problem: Potential for postpartum infection related to presence of episiotomy/vaginal tear and/or uterine contamination  Goal: Patient will be absent from signs and symptoms of infection  Outcome: PROGRESSING AS EXPECTED  Patient afebrile.

## 2017-05-03 NOTE — PROGRESS NOTES
Report received.  Patient positioning self.  Patient feeling pressure and complete at 0806.  Pushing started, coaching in paddles, squatting, side lying.  Dr Woods called for delivery at 0840.   at 0852.      Recovery: Baby skin to skin.  Firm/at u/moderate to light, patient ambulated to bathroom, unable to void, patient straight cathed for 300ml.  Bleeding improved.  Pericare done.

## 2017-05-04 VITALS
HEIGHT: 63 IN | TEMPERATURE: 98.1 F | WEIGHT: 176 LBS | RESPIRATION RATE: 16 BRPM | SYSTOLIC BLOOD PRESSURE: 111 MMHG | DIASTOLIC BLOOD PRESSURE: 69 MMHG | BODY MASS INDEX: 31.18 KG/M2 | HEART RATE: 67 BPM | OXYGEN SATURATION: 97 %

## 2017-05-04 PROCEDURE — A9270 NON-COVERED ITEM OR SERVICE: HCPCS | Performed by: OBSTETRICS & GYNECOLOGY

## 2017-05-04 PROCEDURE — 700102 HCHG RX REV CODE 250 W/ 637 OVERRIDE(OP): Performed by: OBSTETRICS & GYNECOLOGY

## 2017-05-04 PROCEDURE — 700112 HCHG RX REV CODE 229: Performed by: OBSTETRICS & GYNECOLOGY

## 2017-05-04 RX ORDER — OXYCODONE HYDROCHLORIDE AND ACETAMINOPHEN 5; 325 MG/1; MG/1
1-2 TABLET ORAL EVERY 4 HOURS PRN
Qty: 15 TAB | Refills: 0 | Status: SHIPPED | OUTPATIENT
Start: 2017-05-04 | End: 2019-03-18

## 2017-05-04 RX ORDER — PSEUDOEPHEDRINE HCL 30 MG
100 TABLET ORAL 2 TIMES DAILY
Qty: 60 CAP | Refills: 0 | Status: SHIPPED | OUTPATIENT
Start: 2017-05-04 | End: 2019-03-18

## 2017-05-04 RX ORDER — IBUPROFEN 600 MG/1
600 TABLET ORAL EVERY 6 HOURS PRN
Qty: 30 TAB | Refills: 1 | Status: SHIPPED | OUTPATIENT
Start: 2017-05-04 | End: 2019-03-18

## 2017-05-04 RX ADMIN — DOCUSATE SODIUM 100 MG: 100 CAPSULE ORAL at 08:32

## 2017-05-04 RX ADMIN — IBUPROFEN 600 MG: 600 TABLET, FILM COATED ORAL at 06:34

## 2017-05-04 RX ADMIN — Medication 1 TABLET: at 08:32

## 2017-05-04 ASSESSMENT — PAIN SCALES - GENERAL
PAINLEVEL_OUTOF10: 3
PAINLEVEL_OUTOF10: 0
PAINLEVEL_OUTOF10: 0

## 2017-05-04 NOTE — PROGRESS NOTES
Patient off unit with infant and spouse and hospital escort at 1400. Discharge instructions reviewed and signed; copy given to patient and placed in chart. F/U appointment discussed. Patient verbalizes understanding.

## 2017-05-04 NOTE — PROGRESS NOTES
POSTPARTUM DAY 1    Afeb, VS nl    No complaints, wants to go home if baby DCd (GBS pos), instructions given.    LAB:  Results for PRAVIN PAUL (MRN [id number]) as of 5/4/2017 05:51   Ref. Range 5/3/2017 05:00 5/3/2017 17:56   WBC Latest Ref Range: 4.8-10.8 K/uL 14.4 (H) 17.3 (H)   RBC Latest Ref Range: 4.20-5.40 M/uL 4.63 3.60 (L)   Hemoglobin Latest Ref Range: 12.0-16.0 g/dL 13.3 10.4 (L)   Hematocrit Latest Ref Range: 37.0-47.0 % 39.7 31.1 (L)   MCV Latest Ref Range: 81.4-97.8 fL 85.7 87.5   MCH Latest Ref Range: 27.0-33.0 pg 28.7 28.6   MCHC Latest Ref Range: 33.6-35.0 g/dL 33.5 (L) 32.7 (L)   RDW Latest Ref Range: 35.9-50.0 fL 41.3 43.0   Platelet Count Latest Ref Range: 164-446 K/uL 165 146 (L)       PE:  Ut NT, calves NT    PLAN:  Discharge home, followup 6 weeks, Rx ibuprofen, percocet 5/325 #15, PNV, colace BID 4 weeks.

## 2017-05-04 NOTE — CONSULTS
Mother with c/o pain when BF, reports baby BF approximately Q 1.5 hours, assisted with proper positioning for deeper latch, widely flanged top lip noted but bottom lip sucked in, after bottom lip flipped out mother reports decreased discomfort, baby circumcised this morning and asleep after a few sucks, unable to achieve prolonged feeding at this time, educated on effect of circumcision on BF/infant wakefulness, encouraged to attempt to BF again in 1.5-2 hours and call for assistance as needed, educated on outpatient assistance available at [facility] and encouraged to seek ongoing support and assistance as needed.

## 2017-05-04 NOTE — PROGRESS NOTES
Report received at 0700. Assessment completed: VSS. Patient ambulates by self. Fundus firm, lochia light rubra. Patient states pain 3/10; medicated per MAR. Patient would like medication offered when available. Bed in lowest locked position. Call light in place. All questions and concerns addressed. Will continue to monitor.

## 2017-05-04 NOTE — CARE PLAN
Problem: Alteration in comfort related to episiotomy, vaginal repair and/or after birth pains  Goal: Patient is able to ambulate, care for self and infant  Outcome: PROGRESSING AS EXPECTED  Medicated per MAR.     Problem: Potential knowledge deficit related to lack of understanding of self and  care  Goal: Patient will verbalize understanding of self and infant care  Outcome: PROGRESSING AS EXPECTED  Discharge education reviewed; patient verbalizes understanding.

## 2017-05-04 NOTE — DISCHARGE INSTRUCTIONS
POSTPARTUM DISCHARGE INSTRUCTIONS FOR MOM    YOB: 1985   Age: 31 y.o.               Admit Date: 5/3/2017     Discharge Date: 5/4/2017  Attending Doctor:  Daniel Woods M.D.                  Allergies:  Review of patient's allergies indicates not on file.    Discharged to home by car. Discharged via wheelchair, hospital escort: Yes.  Special equipment needed: Not Applicable  Belongings with: Personal  Be sure to schedule a follow-up appointment with your primary care doctor or any specialists as instructed.     Discharge Plan:   Diet Plan: Discussed  Activity Level: Discussed  Confirmed Follow up Appointment: Patient to Call and Schedule Appointment  Confirmed Symptoms Management: Discussed  Medication Reconciliation Updated: Yes  Influenza Vaccine Indication: Not indicated: Previously immunized this influenza season and > 8 years of age    REASONS TO CALL YOUR OBSTETRICIAN:  1.   Persistent fever or shaking chills (Temperature higher than 100.4)  2.   Heavy bleeding (soaking more than 1 pad per hour); Passing clots  3.   Foul odor from vagina  4.   Mastitis (Breast infection; breast pain, chills, fever, redness)  5.   Urinary pain, burning or frequency  6.   Episiotomy infection    8.   Severe depression longer than 24 hours    HAND WASHING  · Prior to handling the baby.  · Before breastfeeding or bottle feeding baby.  · After using the bathroom or changing the baby's diaper.      VAGINAL CARE  · Nothing inside vagina for 6 weeks: no sexual intercourse, tampons or douching.  · Bleeding may continue for 2-4 weeks.  Amount may vary.    · Call your physician for heavy bleeding which means soaking more than 1 pad per hour    BIRTH CONTROL  · It is possible to become pregnant at any time after delivery and while breastfeeding.  · Plan to discuss a method of birth control with your physician at your follow up visit. visit.    DIET AND ELIMINATION  · Eating more fiber (bran cereal, fruits, and vegetables)  "and drinking plenty of fluids will help to avoid constipation.  · Urinary frequency after childbirth is normal.    POSTPARTUM BLUES  During the first few days after birth, you may experience a sense of the \"blues\" which may include impatience, irritability or even crying.  These feeling come and go quickly.  However, as many as 1 in 10 women experience emotional symptoms known as postpartum depression.    Postpartum depression:  May start as early as the second or third day after delivery or take several weeks or months to develop.  Symptoms of \"blues\" are present, but are more intense:  Crying spells; loss of appetite; feelings of hopelessness or loss of control; fear of touching the baby; over concern or no concern at all about the baby; little or no concern about your own appearance/caring for yourself; and/or inability to sleep or excessive sleeping.  Contact your physician if you are experiencing any of these symptoms.    Crisis Hotline:  · [place] Crisis Hotline:  6-193-ONSKXHU  Or 1-806.713.2296  · [place] Crisis Hotline:  1-282.640.3462  Or 798-158-6600    PREVENTING SHAKEN BABY:  If you are angry or stressed, PUT THE BABY IN THE CRIB, step away, take some deep breaths, and wait until you are calm to care for the baby.  DO NOT SHAKE THE BABY.  You are not alone, call a supporter for help.    · Crisis Call Center 24/7 crisis line 245-838-9610 or 1-975.397.2358  · You can also text them, text \"ANSWER\" to 277126    QUIT SMOKING/TOBACCO USE:  I understand the use of any tobacco products increases my chance of suffering from future heart disease and could cause other illnesses which may shorten my life. Quitting the use of tobacco products is the single most important thing I can do to improve my health. For further information on smoking / tobacco cessation call a Toll Free Quit Line at 1-134.635.4611 (*National Cancer White) or 1-545.514.7584 (American Lung Association) or you can access the web based " program at www.lungusa.org.    · [place] Tobacco Users Help Line:  (294) 283-5743       Toll Free: 1-704.813.8352  · Quit Tobacco Program [facility] (592)401-2381    DEPRESSION / SUICIDE RISK:  As you are discharged from this [facility], it is important to learn how to keep safe from harming yourself.    Recognize the warning signs:  · Abrupt changes in personality, positive or negative- including increase in energy   · Giving away possessions  · Change in eating patterns- significant weight changes-  positive or negative  · Change in sleeping patterns- unable to sleep or sleeping all the time   · Unwillingness or inability to communicate  · Depression  · Unusual sadness, discouragement and loneliness  · Talk of wanting to die  · Neglect of personal appearance   · Rebelliousness- reckless behavior  · Withdrawal from people/activities they love  · Confusion- inability to concentrate     If you or a loved one observes any of these behaviors or has concerns about self-harm, here's what you can do:  · Talk about it- your feelings and reasons for harming yourself  · Remove any means that you might use to hurt yourself (examples: pills, rope, extension cords, firearm)  · Get professional help from the community (Mental Health, Substance Abuse, psychological counseling)  · Do not be alone:Call your Safe Contact- someone whom you trust who will be there for you.  · Call your local CRISIS HOTLINE 633-9034 or 117-923-8276  · Call your local Children's Mobile Crisis Response Team [place] (287) 420-5868 or www.Casual Steps  · Call the toll free National Suicide Prevention Hotlines   · National Suicide Prevention Lifeline 838-896-JVHR (2661)  · National Hope Line Network 800-SUICIDE (067-8622)    DISCHARGE SURVEY:  Thank you for choosing [facility].  We hope we provided you with very good care.  You may be receiving a survey in the mail.  Please fill it out.  Your opinion is  valuable to us.    ADDITIONAL EDUCATIONAL MATERIALS GIVEN TO PATIENT:        My signature on this form indicates that:  1.  I have reviewed and understand the above information  2.  My questions regarding this information have been answered to my satisfaction.  3.  I have formulated a plan with my discharge nurse to obtain my prescribed medication for home.

## 2019-02-25 ENCOUNTER — TELEPHONE (OUTPATIENT)
Dept: HEALTH INFORMATION MANAGEMENT | Facility: OTHER | Age: 34
End: 2019-02-25

## 2019-03-14 ENCOUNTER — NON-PROVIDER VISIT (OUTPATIENT)
Dept: OCCUPATIONAL MEDICINE | Facility: CLINIC | Age: 34
End: 2019-03-14

## 2019-03-14 ENCOUNTER — HOSPITAL ENCOUNTER (OUTPATIENT)
Facility: MEDICAL CENTER | Age: 34
End: 2019-03-14
Attending: PREVENTIVE MEDICINE
Payer: COMMERCIAL

## 2019-03-14 DIAGNOSIS — Z02.89 VISIT FOR OCCUPATIONAL HEALTH EXAMINATION: ICD-10-CM

## 2019-03-14 DIAGNOSIS — Z02.89 VISIT FOR OCCUPATIONAL HEALTH EXAMINATION: Primary | ICD-10-CM

## 2019-03-14 PROCEDURE — 86480 TB TEST CELL IMMUN MEASURE: CPT | Performed by: PREVENTIVE MEDICINE

## 2019-03-15 LAB
GAMMA INTERFERON BACKGROUND BLD IA-ACNC: 0.02 IU/ML
M TB IFN-G BLD-IMP: NEGATIVE
M TB IFN-G CD4+ BCKGRND COR BLD-ACNC: 0 IU/ML
MITOGEN IGNF BCKGRD COR BLD-ACNC: >10 IU/ML
QFT TB2 - NIL TBQ2: 0 IU/ML

## 2019-03-18 ENCOUNTER — OFFICE VISIT (OUTPATIENT)
Dept: MEDICAL GROUP | Facility: MEDICAL CENTER | Age: 34
End: 2019-03-18
Payer: COMMERCIAL

## 2019-03-18 VITALS
OXYGEN SATURATION: 99 % | HEART RATE: 74 BPM | RESPIRATION RATE: 16 BRPM | SYSTOLIC BLOOD PRESSURE: 122 MMHG | BODY MASS INDEX: 28.7 KG/M2 | TEMPERATURE: 98.2 F | HEIGHT: 63 IN | WEIGHT: 162 LBS | DIASTOLIC BLOOD PRESSURE: 68 MMHG

## 2019-03-18 DIAGNOSIS — Z76.89 ENCOUNTER TO ESTABLISH CARE: ICD-10-CM

## 2019-03-18 DIAGNOSIS — Z13.6 SCREENING FOR CARDIOVASCULAR CONDITION: ICD-10-CM

## 2019-03-18 DIAGNOSIS — G44.229 CHRONIC TENSION-TYPE HEADACHE, NOT INTRACTABLE: ICD-10-CM

## 2019-03-18 DIAGNOSIS — R53.83 OTHER FATIGUE: ICD-10-CM

## 2019-03-18 DIAGNOSIS — Z00.00 ROUTINE GENERAL MEDICAL EXAMINATION AT A HEALTH CARE FACILITY: ICD-10-CM

## 2019-03-18 DIAGNOSIS — Z82.49 FAMILY HISTORY OF HEART DISEASE: ICD-10-CM

## 2019-03-18 DIAGNOSIS — M25.512 ACUTE PAIN OF LEFT SHOULDER: ICD-10-CM

## 2019-03-18 PROCEDURE — 99203 OFFICE O/P NEW LOW 30 MIN: CPT | Performed by: NURSE PRACTITIONER

## 2019-03-18 NOTE — PROGRESS NOTES
CC: establish care/left shoulder back pain      Nilam Freed is a 33 y.o. female here to establish care and to discuss the evaluation and management of:      Here to establish care.  Has not had a former PCP.    Patient's medical history includes tension headaches.  Surgical history includes tonsillectomy.  Family history includes mother with a heart attack, hyperlipidemia, hypertension, blood pressure and diabetes.  Father recently passed away in 2018 of pancreatic cancer.  He did have coronary artery disease and dementia.  She does have a twin brother who does have a developmental delay.  Maternal grandfather with coronary artery disease and paternal grandfather with stroke, heart attack and dementia.  She does not use any tobacco products, she does endorse alcohol use, no drug use she is sexually active. .Currently taking a multivitamin    Left shoulder pain  Has been present for about 2-3 months, no trauma or injury. Numbness and tingling-just in that area on her back by her scapula. Does feel hot. Massage has helped 50%.  Taking ibuprofen every other day.     Tension headache/stress headache  Patient states she gets frequent tension and stress headaches.  A lot of it stems from her job.  She is a physician's assistant.    ROS:  Denies any Headache, Blurred Vision, Confusion, Chest pain,  Shortness of breath,  Abdominal pain, Changes of bowel or bladder, Hematuria, Hematochezia, Lower ext. edema, Fevers, Nights sweats, Weight Changes, Focal weakness or numbness.  And all other systems are negative. Left shoulder, scapula pain      Current Outpatient Prescriptions:   •  Multiple Vitamins-Minerals (WOMENS MULTIVITAMIN) Tab, Take  by mouth., Disp: , Rfl:     Not on File    History reviewed. No pertinent past medical history.  Past Surgical History:   Procedure Laterality Date   • TONSILLECTOMY       Family History   Problem Relation Age of Onset   • Heart Attack Mother    • Hyperlipidemia  "Mother    • Hypertension Mother    • Diabetes Mother    • Cancer Father         pancreatic-2018   • Heart Disease Father         CAD   • Dementia Father    • Other Brother         developmental delay   • No Known Problems Maternal Grandmother    • Heart Disease Maternal Grandfather         CAD   • No Known Problems Paternal Grandmother    • Stroke Paternal Grandfather    • Heart Attack Paternal Grandfather    • Dementia Paternal Grandfather      Social History     Social History   • Marital status:      Spouse name: N/A   • Number of children: N/A   • Years of education: N/A     Occupational History   • Not on file.     Social History Main Topics   • Smoking status: Never Smoker   • Smokeless tobacco: Never Used   • Alcohol use Yes   • Drug use: No   • Sexual activity: Yes     Partners: Male     Birth control/ protection: Condom     Other Topics Concern   • Not on file     Social History Narrative   • No narrative on file       Objective:     Vitals: /68   Pulse 74   Temp 36.8 °C (98.2 °F)   Resp 16   Ht 1.6 m (5' 3\")   Wt 73.5 kg (162 lb)   SpO2 99%   BMI 28.70 kg/m²      General: Alert, pleasant, NAD  HEENT:  Normocephalic.  Neck supple.  No thyromegaly or masses palpated. No cervical or supraclavicular lymphadenopathy.  Heart:  Regular rate and rhythm.  S1 and S2 normal.  No murmurs appreciated.    Respiratory:  Normal respiratory effort.  Clear to auscultation bilaterally.    Skin:  Warm, dry, no rashes  Musculoskeletal:  Gait is normal.  Moves all extremities well.  Tenderness to left scapula along the levator scapulae, no bruising, no crepitus  Extremities:   No leg edema.  Neurological: No tremors, sensation grossly intact,   Psych:  Affect/mood is normal, judgement is good, memory is intact, grooming is appropriate.      Assessment and Plan.   33 y.o. female to establish care and discuss the followin. Acute pain of left shoulder  Appears musculoskeletal in nature.  Posterior " aspect of left shoulder along scapula.  Have advised using heat, ice, anti-inflammatories and massage.  She will notify me if she would like to do any physical therapy.    2. Chronic tension-type headache, not intractable  Chronic.  Due to stress and tension.  Again have encouraged stress relieving techniques, massage and stretches.    3. Other fatigue  - Comp Metabolic Panel; Future  - CBC WITHOUT DIFFERENTIAL; Future  - TSH+FREE T4  - TRIIDOTHYRONINE; Future    4. Family history of heart disease  We have discussed the possibility of referring over to vascular medicine for consultation regarding the strong family history of cardiovascular disease.    5. Encounter to establish care    6. Routine general medical examination at a health care facility  Has not had any routine labs completed.  She does have a strong family history of cardiovascular disease.  - Comp Metabolic Panel; Future  - CBC WITHOUT DIFFERENTIAL; Future  - TSH+FREE T4  - TRIIDOTHYRONINE; Future    7. Screening for cardiovascular condition  - Lipid Profile; Future    Other orders  - Multiple Vitamins-Minerals (WOMENS MULTIVITAMIN) Tab; Take  by mouth.        No Follow-up on file.        Debbie ROSADO.

## 2019-03-25 ENCOUNTER — HOSPITAL ENCOUNTER (OUTPATIENT)
Dept: LAB | Facility: MEDICAL CENTER | Age: 34
End: 2019-03-25
Attending: NURSE PRACTITIONER
Payer: COMMERCIAL

## 2019-03-25 DIAGNOSIS — Z00.00 ROUTINE GENERAL MEDICAL EXAMINATION AT A HEALTH CARE FACILITY: ICD-10-CM

## 2019-03-25 DIAGNOSIS — R53.83 OTHER FATIGUE: ICD-10-CM

## 2019-03-25 DIAGNOSIS — Z13.6 SCREENING FOR CARDIOVASCULAR CONDITION: ICD-10-CM

## 2019-03-25 LAB
ALBUMIN SERPL BCP-MCNC: 4.4 G/DL (ref 3.2–4.9)
ALBUMIN/GLOB SERPL: 1.6 G/DL
ALP SERPL-CCNC: 37 U/L (ref 30–99)
ALT SERPL-CCNC: 8 U/L (ref 2–50)
ANION GAP SERPL CALC-SCNC: 4 MMOL/L (ref 0–11.9)
AST SERPL-CCNC: 14 U/L (ref 12–45)
BILIRUB SERPL-MCNC: 0.6 MG/DL (ref 0.1–1.5)
BUN SERPL-MCNC: 12 MG/DL (ref 8–22)
CALCIUM SERPL-MCNC: 9.1 MG/DL (ref 8.5–10.5)
CHLORIDE SERPL-SCNC: 107 MMOL/L (ref 96–112)
CHOLEST SERPL-MCNC: 192 MG/DL (ref 100–199)
CO2 SERPL-SCNC: 27 MMOL/L (ref 20–33)
CREAT SERPL-MCNC: 0.92 MG/DL (ref 0.5–1.4)
ERYTHROCYTE [DISTWIDTH] IN BLOOD BY AUTOMATED COUNT: 41.5 FL (ref 35.9–50)
GLOBULIN SER CALC-MCNC: 2.7 G/DL (ref 1.9–3.5)
GLUCOSE SERPL-MCNC: 92 MG/DL (ref 65–99)
HCT VFR BLD AUTO: 41.3 % (ref 37–47)
HDLC SERPL-MCNC: 55 MG/DL
HGB BLD-MCNC: 13.6 G/DL (ref 12–16)
LDLC SERPL CALC-MCNC: 123 MG/DL
MCH RBC QN AUTO: 30 PG (ref 27–33)
MCHC RBC AUTO-ENTMCNC: 32.9 G/DL (ref 33.6–35)
MCV RBC AUTO: 91.2 FL (ref 81.4–97.8)
PLATELET # BLD AUTO: 209 K/UL (ref 164–446)
PMV BLD AUTO: 11.2 FL (ref 9–12.9)
POTASSIUM SERPL-SCNC: 4 MMOL/L (ref 3.6–5.5)
PROT SERPL-MCNC: 7.1 G/DL (ref 6–8.2)
RBC # BLD AUTO: 4.53 M/UL (ref 4.2–5.4)
SODIUM SERPL-SCNC: 138 MMOL/L (ref 135–145)
T3 SERPL-MCNC: 111.3 NG/DL (ref 60–181)
T4 FREE SERPL-MCNC: 0.92 NG/DL (ref 0.53–1.43)
TRIGL SERPL-MCNC: 68 MG/DL (ref 0–149)
TSH SERPL DL<=0.005 MIU/L-ACNC: 2.13 UIU/ML (ref 0.38–5.33)
WBC # BLD AUTO: 5.5 K/UL (ref 4.8–10.8)

## 2019-03-25 PROCEDURE — 84439 ASSAY OF FREE THYROXINE: CPT

## 2019-03-25 PROCEDURE — 80061 LIPID PANEL: CPT

## 2019-03-25 PROCEDURE — 84480 ASSAY TRIIODOTHYRONINE (T3): CPT

## 2019-03-25 PROCEDURE — 36415 COLL VENOUS BLD VENIPUNCTURE: CPT

## 2019-03-25 PROCEDURE — 84443 ASSAY THYROID STIM HORMONE: CPT

## 2019-03-25 PROCEDURE — 85027 COMPLETE CBC AUTOMATED: CPT

## 2019-03-25 PROCEDURE — 80053 COMPREHEN METABOLIC PANEL: CPT

## 2019-09-30 DIAGNOSIS — Z30.09 FAMILY PLANNING: ICD-10-CM

## 2019-09-30 NOTE — PROGRESS NOTES
Patient requesting referral for IVF.  Has had in the past.    Dr. Gomes at [facility]   Fax: [phone]  Phone: [phone]

## 2019-10-28 ENCOUNTER — HOSPITAL ENCOUNTER (OUTPATIENT)
Dept: RADIOLOGY | Facility: MEDICAL CENTER | Age: 34
End: 2019-10-28
Attending: FAMILY MEDICINE
Payer: COMMERCIAL

## 2019-10-28 DIAGNOSIS — D48.7 NEOPLASM OF UNCERTAIN BEHAVIOR OF OTHER SPECIFIED SITES: ICD-10-CM

## 2019-10-28 PROCEDURE — 76882 US LMTD JT/FCL EVL NVASC XTR: CPT | Mod: RT

## 2019-12-17 ENCOUNTER — HOSPITAL ENCOUNTER (OUTPATIENT)
Dept: LAB | Facility: MEDICAL CENTER | Age: 34
End: 2019-12-17
Attending: ADVANCED PRACTICE MIDWIFE
Payer: COMMERCIAL

## 2019-12-17 LAB
ABO GROUP BLD: NORMAL
BLD GP AB SCN SERPL QL: NORMAL
ERYTHROCYTE [DISTWIDTH] IN BLOOD BY AUTOMATED COUNT: 41.8 FL (ref 35.9–50)
HCT VFR BLD AUTO: 40.3 % (ref 37–47)
HGB BLD-MCNC: 13.9 G/DL (ref 12–16)
MCH RBC QN AUTO: 31.3 PG (ref 27–33)
MCHC RBC AUTO-ENTMCNC: 34.5 G/DL (ref 33.6–35)
MCV RBC AUTO: 90.8 FL (ref 81.4–97.8)
PLATELET # BLD AUTO: 225 K/UL (ref 164–446)
PMV BLD AUTO: 10.9 FL (ref 9–12.9)
RBC # BLD AUTO: 4.44 M/UL (ref 4.2–5.4)
RH BLD: NORMAL
WBC # BLD AUTO: 8.4 K/UL (ref 4.8–10.8)

## 2019-12-17 PROCEDURE — 87086 URINE CULTURE/COLONY COUNT: CPT

## 2019-12-17 PROCEDURE — 36415 COLL VENOUS BLD VENIPUNCTURE: CPT

## 2019-12-17 PROCEDURE — 87340 HEPATITIS B SURFACE AG IA: CPT

## 2019-12-17 PROCEDURE — 87591 N.GONORRHOEAE DNA AMP PROB: CPT

## 2019-12-17 PROCEDURE — 86900 BLOOD TYPING SEROLOGIC ABO: CPT

## 2019-12-17 PROCEDURE — 81003 URINALYSIS AUTO W/O SCOPE: CPT

## 2019-12-17 PROCEDURE — 86762 RUBELLA ANTIBODY: CPT

## 2019-12-17 PROCEDURE — 86901 BLOOD TYPING SEROLOGIC RH(D): CPT

## 2019-12-17 PROCEDURE — 86850 RBC ANTIBODY SCREEN: CPT

## 2019-12-17 PROCEDURE — 85027 COMPLETE CBC AUTOMATED: CPT

## 2019-12-17 PROCEDURE — 87389 HIV-1 AG W/HIV-1&-2 AB AG IA: CPT

## 2019-12-17 PROCEDURE — 87491 CHLMYD TRACH DNA AMP PROBE: CPT

## 2019-12-17 PROCEDURE — 86780 TREPONEMA PALLIDUM: CPT

## 2019-12-18 LAB
APPEARANCE UR: NORMAL
BILIRUB UR QL STRIP.AUTO: NORMAL
C TRACH DNA SPEC QL NAA+PROBE: NEGATIVE
COLOR UR: NORMAL
GLUCOSE UR STRIP.AUTO-MCNC: NORMAL MG/DL
HBV SURFACE AG SER QL: NEGATIVE
HIV 1+2 AB+HIV1 P24 AG SERPL QL IA: NON REACTIVE
KETONES UR STRIP.AUTO-MCNC: NORMAL MG/DL
LEUKOCYTE ESTERASE UR QL STRIP.AUTO: NORMAL
MICRO URNS: NORMAL
N GONORRHOEA DNA SPEC QL NAA+PROBE: NEGATIVE
NITRITE UR QL STRIP.AUTO: NORMAL
PH UR STRIP.AUTO: NORMAL [PH] (ref 5–8)
PROT UR QL STRIP: NORMAL MG/DL
RBC UR QL AUTO: NORMAL
RUBV AB SER QL: 66.7 IU/ML
SP GR UR STRIP.AUTO: NORMAL
SPECIMEN SOURCE: NORMAL
TREPONEMA PALLIDUM IGG+IGM AB [PRESENCE] IN SERUM OR PLASMA BY IMMUNOASSAY: NON REACTIVE
UROBILINOGEN UR STRIP.AUTO-MCNC: NORMAL MG/DL

## 2019-12-20 LAB
BACTERIA UR CULT: NORMAL
SIGNIFICANT IND 70042: NORMAL
SITE SITE: NORMAL
SOURCE SOURCE: NORMAL

## 2019-12-23 ENCOUNTER — HOSPITAL ENCOUNTER (OUTPATIENT)
Dept: LAB | Facility: MEDICAL CENTER | Age: 34
End: 2019-12-23
Attending: ADVANCED PRACTICE MIDWIFE
Payer: COMMERCIAL

## 2019-12-23 PROCEDURE — 81508 FTL CGEN ABNOR TWO PROTEINS: CPT

## 2019-12-23 PROCEDURE — 36415 COLL VENOUS BLD VENIPUNCTURE: CPT

## 2019-12-25 LAB
# FETUSES US: NORMAL
AGE - REPORTED: 34.9 YR
COLLECT DATE: NORMAL
CURRENT SMOKER: NO
FET CRL US.MEAS: 69.9 MM
FET CRL US.MEAS: NORMAL MM
FET NUCHAL FOLD MOM THICKNESS US.MEAS: 0.88
FET NUCHAL FOLD MOM THICKNESS US.MEAS: NORMAL
FET NUCHAL FOLD THICKNESS US.MEAS: 1.3 MM
GA: NORMAL WK
HCG MOM SERPL: 1.4
HCG SERPL-ACNC: NORMAL IU/L
HX OF HEREDITARY DISORDERS: NO
INTEGRATED SCN PATIENT-IMP: NORMAL
NUCHAL TRANSLUCENCY (NT), TWIN B Q0252: NORMAL MM
PAPP-A MOM SERPL: 1.52
PAPP-A SERPL-MCNC: 1517.9 NG/ML
PATHOLOGY STUDY: NORMAL
SONOGRAPHER NAME: NORMAL
SONOGRAPHER: NORMAL
SPECIMEN DRAWN SERPL: NORMAL
US DATE: NORMAL

## 2020-01-15 ENCOUNTER — HOSPITAL ENCOUNTER (OUTPATIENT)
Dept: LAB | Facility: MEDICAL CENTER | Age: 35
End: 2020-01-15
Attending: ADVANCED PRACTICE MIDWIFE
Payer: COMMERCIAL

## 2020-01-15 PROCEDURE — 81511 FTL CGEN ABNOR FOUR ANAL: CPT

## 2020-01-15 PROCEDURE — 36415 COLL VENOUS BLD VENIPUNCTURE: CPT

## 2020-01-19 LAB
# FETUSES US: NORMAL
AFP MOM SERPL: 0.88
AFP SERPL-MCNC: 28 NG/ML
AGE - REPORTED: 34.9 YR
CURRENT SMOKER: NO
FAMILY MEMBER DISEASES HX: NO
FET CRL US.MEAS: 69.9 MM
FET CRL US.MEAS: NORMAL MM
FET NUCHAL FOLD MOM THICKNESS US.MEAS: 0.88
FET NUCHAL FOLD MOM THICKNESS US.MEAS: NORMAL
FET NUCHAL FOLD THICKNESS US.MEAS: 1.3 MM
GA: NORMAL WK
HCG MOM SERPL: 1.84
HCG SERPL-ACNC: NORMAL IU/L
HX OF HEREDITARY DISORDERS: NO
IDDM PATIENT QL: NO
INHIBIN A MOM SERPL: 1.01
INHIBIN A SERPL-MCNC: 162 PG/ML
INTEGRATED SCN PATIENT-IMP: NORMAL
NUCHAL TRANSLUCENCY (NT), TWIN B Q0252: NORMAL MM
PAPP-A MOM SERPL: 1.52
PAPP-A SERPL-MCNC: 1517.9 NG/ML
PATHOLOGY STUDY: NORMAL
SONOGRAPHER NAME: NORMAL
SONOGRAPHER: NORMAL
SPECIMEN DRAWN SERPL: NORMAL
U ESTRIOL MOM SERPL: 0.96
U ESTRIOL SERPL-MCNC: 0.99 NG/ML
US DATE: NORMAL

## 2020-03-25 ENCOUNTER — HOSPITAL ENCOUNTER (OUTPATIENT)
Dept: LAB | Facility: MEDICAL CENTER | Age: 35
End: 2020-03-25
Attending: NURSE PRACTITIONER
Payer: COMMERCIAL

## 2020-03-25 LAB
ERYTHROCYTE [DISTWIDTH] IN BLOOD BY AUTOMATED COUNT: 46.6 FL (ref 35.9–50)
GLUCOSE 1H P 50 G GLC PO SERPL-MCNC: 125 MG/DL (ref 70–139)
HCT VFR BLD AUTO: 36.5 % (ref 37–47)
HGB BLD-MCNC: 11.9 G/DL (ref 12–16)
MCH RBC QN AUTO: 31.2 PG (ref 27–33)
MCHC RBC AUTO-ENTMCNC: 32.6 G/DL (ref 33.6–35)
MCV RBC AUTO: 95.5 FL (ref 81.4–97.8)
PLATELET # BLD AUTO: 176 K/UL (ref 164–446)
PMV BLD AUTO: 11.1 FL (ref 9–12.9)
RBC # BLD AUTO: 3.82 M/UL (ref 4.2–5.4)
TREPONEMA PALLIDUM IGG+IGM AB [PRESENCE] IN SERUM OR PLASMA BY IMMUNOASSAY: NORMAL
WBC # BLD AUTO: 9.2 K/UL (ref 4.8–10.8)

## 2020-03-25 PROCEDURE — 36415 COLL VENOUS BLD VENIPUNCTURE: CPT

## 2020-03-25 PROCEDURE — 86780 TREPONEMA PALLIDUM: CPT

## 2020-03-25 PROCEDURE — 82950 GLUCOSE TEST: CPT

## 2020-03-25 PROCEDURE — 85027 COMPLETE CBC AUTOMATED: CPT

## 2025-04-23 ENCOUNTER — APPOINTMENT (OUTPATIENT)
Dept: URBAN - METROPOLITAN AREA CLINIC 4 | Facility: CLINIC | Age: 40
Setting detail: DERMATOLOGY
End: 2025-04-23

## 2025-04-23 DIAGNOSIS — Z71.89 OTHER SPECIFIED COUNSELING: ICD-10-CM

## 2025-04-23 DIAGNOSIS — D22 MELANOCYTIC NEVI: ICD-10-CM

## 2025-04-23 PROBLEM — D22.39 MELANOCYTIC NEVI OF OTHER PARTS OF FACE: Status: ACTIVE | Noted: 2025-04-23

## 2025-04-23 PROCEDURE — ? SUNSCREEN RECOMMENDATIONS

## 2025-04-23 PROCEDURE — ? COUNSELING

## 2025-04-23 PROCEDURE — 99202 OFFICE O/P NEW SF 15 MIN: CPT

## 2025-04-23 ASSESSMENT — LOCATION DETAILED DESCRIPTION DERM
LOCATION DETAILED: RIGHT ULNAR DORSAL HAND
LOCATION DETAILED: RIGHT NASAL ALA
LOCATION DETAILED: LEFT ULNAR DORSAL HAND

## 2025-04-23 ASSESSMENT — LOCATION ZONE DERM
LOCATION ZONE: NOSE
LOCATION ZONE: HAND

## 2025-04-23 ASSESSMENT — LOCATION SIMPLE DESCRIPTION DERM
LOCATION SIMPLE: RIGHT NOSE
LOCATION SIMPLE: RIGHT HAND
LOCATION SIMPLE: LEFT HAND